# Patient Record
Sex: FEMALE | Race: ASIAN | NOT HISPANIC OR LATINO
[De-identification: names, ages, dates, MRNs, and addresses within clinical notes are randomized per-mention and may not be internally consistent; named-entity substitution may affect disease eponyms.]

---

## 2019-03-25 ENCOUNTER — TRANSCRIPTION ENCOUNTER (OUTPATIENT)
Age: 24
End: 2019-03-25

## 2019-09-10 ENCOUNTER — TRANSCRIPTION ENCOUNTER (OUTPATIENT)
Age: 24
End: 2019-09-10

## 2020-01-05 ENCOUNTER — TRANSCRIPTION ENCOUNTER (OUTPATIENT)
Age: 25
End: 2020-01-05

## 2022-04-12 PROBLEM — Z00.00 ENCOUNTER FOR PREVENTIVE HEALTH EXAMINATION: Status: ACTIVE | Noted: 2022-04-12

## 2022-05-18 ENCOUNTER — APPOINTMENT (OUTPATIENT)
Dept: BREAST CENTER | Facility: CLINIC | Age: 27
End: 2022-05-18

## 2022-08-19 ENCOUNTER — APPOINTMENT (OUTPATIENT)
Dept: OBGYN | Facility: CLINIC | Age: 27
End: 2022-08-19

## 2022-08-19 VITALS
HEART RATE: 83 BPM | RESPIRATION RATE: 17 BRPM | DIASTOLIC BLOOD PRESSURE: 76 MMHG | HEIGHT: 61 IN | BODY MASS INDEX: 19.83 KG/M2 | OXYGEN SATURATION: 98 % | SYSTOLIC BLOOD PRESSURE: 109 MMHG | WEIGHT: 105 LBS

## 2022-08-19 DIAGNOSIS — Z12.4 ENCOUNTER FOR SCREENING FOR MALIGNANT NEOPLASM OF CERVIX: ICD-10-CM

## 2022-08-19 DIAGNOSIS — Z12.39 ENCOUNTER FOR OTHER SCREENING FOR MALIGNANT NEOPLASM OF BREAST: ICD-10-CM

## 2022-08-19 PROCEDURE — 99385 PREV VISIT NEW AGE 18-39: CPT

## 2022-08-19 NOTE — END OF VISIT
[FreeTextEntry3] : I, Leah Penn, acted as a scribe on behalf of Cami Sweeney NP on 08/19/2022 \par \par All medical entries made by the scribe were at my, Cami Sweeney NP, direction and personally dictated by me on 08/19/2022 . I have reviewed the chart and agree that the record accurately reflects my personal performance of the history, physical exam, assessment and plan. I have also personally directed, reviewed, and agreed with the chart.\par

## 2022-08-19 NOTE — PLAN
[FreeTextEntry1] : HCM\par -Discussed and reviewed importance of monthly BSE; hx fibrocystic breasts\par -Pap test collected and sent at today's visit\par -Vit D/Calcium/exercise, BMD\par -Declined STI testing. Safe sexual practices discussed.\par - Rx given for Breast sono due to FBD\par - baseline mammo/sono for fibrocystic breasts \par -pt is aware that pap may be repeated due to pt being on Day 1 of her menses\par - Discussed preconception care/ PNVs\par -f/u PCP for recommended HCM, vaccinations and CA screening\par -RTO 1 year for annual or sooner if needed.\par \par Discussed issues regarding conception and pregnancy. Discussed prenatal vitamins /folic acid supplementation. Pt should avoid alcohol and NSAIDS. Recived COVID 19 precautions and vaccine during preganncy. Reviewed ovulation and timed intercourse. All questions answered. \par

## 2022-08-19 NOTE — COUNSELING
[Nutrition/ Exercise/ Weight Management] : nutrition, exercise, weight management [Vitamins/Supplements] : vitamins/supplements [Breast Self Exam] : breast self exam [Contraception/ Emergency Contraception/ Safe Sexual Practices] : contraception, emergency contraception, safe sexual practices [Preconception Care/ Fertility options] : preconception care, fertility options [Drugs/Alcohol] : drugs, alcohol [Confidentiality] : confidentiality [STD (testing, results, tx)] : STD (testing, results, tx) [HPV Vaccine] : HPV Vaccine [Lab Results] : lab results [Medication Management] : medication management

## 2022-08-19 NOTE — HISTORY OF PRESENT ILLNESS
[Y] : Patient is sexually active [No] : Patient does not have concerns regarding sex [Currently Active] : currently active [Men] : men [Yes] : Yes [Condoms] : Condoms [Patient refuses STI testing] : Patient refuses STI testing [Regular Cycle Intervals] : periods have been regular [LMPDate] : 08/1922 [TextBox_6] : 08/19/22 [FreeTextEntry1] : 08/19/22

## 2022-08-19 NOTE — PHYSICAL EXAM
[Appropriately responsive] : appropriately responsive [Alert] : alert [No Acute Distress] : no acute distress [No Murmurs] : no murmurs [Soft] : soft [Non-tender] : non-tender [Non-distended] : non-distended [No HSM] : No HSM [No Lesions] : no lesions [No Mass] : no mass [Oriented x3] : oriented x3 [No Masses] : no breast masses were palpable [Labia Majora] : normal [Labia Minora] : normal [Uterine Adnexae] : normal [Moderate] : There was moderate vaginal bleeding [FreeTextEntry6] : TEE [Breast Palpation Diffuse Fibrous Tissue Bilateral] : fibrocystic changes [Normal] : normal [No Discharge] : no discharge [FreeTextEntry4] : Pt is on her first day of menses

## 2022-08-19 NOTE — DISCUSSION/SUMMARY
[FreeTextEntry1] : 26 year y/o G0P LMP 08/19/22 presents for new pt checkup visit. She is doing well and has no complaints. \par \par GYNHx: Pt is sexually active with males. Pt uses condoms as contraceptives. Pt has regular menses. \par OBHx: Biopsy done on right breast 2 years ago, breast exam done as well approximately 2 years\par PMHx: Paternal - colon cancer \par PSHx: N/A\par Social Hx: Pt is an . Pt also denies tobacco use, alcohol use, and drug use.\par Current Meds: N/A\par Allergies: N/A\par NKDA\par \par HCM\par -Discussed and reviewed importance of monthly BSE\par -Pap test collected and sent at today's visit\par -Vit D/Calcium/exercise, BMD\par -Declined STI testing. Safe sexual practices discussed.\par - Rx given for Breast sono due to FBD\par - baseline mammo/sono for fibrocystic breasts \par -pt is aware that pap may be repeated due to pt being on Day 1 of her menses\par - Discussed preconceptions care.\par -f/u PCP for recommended HCM, vaccinations and CA screening\par -RTO 1 year for annual\par \par Discussed issues regarding conception and pregnancy. Discussed prenatal vitamins /folic acid supplementation. Pt should avoid alcohol and NSAIDS. Recived COVID 19 precautions and vaccine during preganncy. Reviewed ovulation and timed intercourse. All questions answered.

## 2022-08-25 ENCOUNTER — NON-APPOINTMENT (OUTPATIENT)
Age: 27
End: 2022-08-25

## 2022-08-25 LAB — CYTOLOGY CVX/VAG DOC THIN PREP: NORMAL

## 2022-09-02 ENCOUNTER — TRANSCRIPTION ENCOUNTER (OUTPATIENT)
Age: 27
End: 2022-09-02

## 2024-04-01 ENCOUNTER — APPOINTMENT (OUTPATIENT)
Dept: OBGYN | Facility: CLINIC | Age: 29
End: 2024-04-01
Payer: COMMERCIAL

## 2024-04-01 ENCOUNTER — ASOB RESULT (OUTPATIENT)
Age: 29
End: 2024-04-01

## 2024-04-01 VITALS
HEIGHT: 61 IN | BODY MASS INDEX: 22.47 KG/M2 | DIASTOLIC BLOOD PRESSURE: 79 MMHG | HEART RATE: 114 BPM | SYSTOLIC BLOOD PRESSURE: 125 MMHG | WEIGHT: 119 LBS

## 2024-04-01 DIAGNOSIS — Z78.9 OTHER SPECIFIED HEALTH STATUS: ICD-10-CM

## 2024-04-01 DIAGNOSIS — N92.6 IRREGULAR MENSTRUATION, UNSPECIFIED: ICD-10-CM

## 2024-04-01 DIAGNOSIS — N60.19 DIFFUSE CYSTIC MASTOPATHY OF UNSPECIFIED BREAST: ICD-10-CM

## 2024-04-01 DIAGNOSIS — Z83.49 FAMILY HISTORY OF OTHER ENDOCRINE, NUTRITIONAL AND METABOLIC DISEASES: ICD-10-CM

## 2024-04-01 DIAGNOSIS — Z80.0 FAMILY HISTORY OF MALIGNANT NEOPLASM OF DIGESTIVE ORGANS: ICD-10-CM

## 2024-04-01 PROCEDURE — 99459 PELVIC EXAMINATION: CPT

## 2024-04-01 PROCEDURE — 76817 TRANSVAGINAL US OBSTETRIC: CPT

## 2024-04-01 PROCEDURE — 99204 OFFICE O/P NEW MOD 45 MIN: CPT

## 2024-04-01 RX ORDER — PRENATAL VIT 49/IRON FUM/FOLIC 6.75-0.2MG
TABLET ORAL
Refills: 0 | Status: ACTIVE | COMMUNITY

## 2024-04-01 NOTE — PHYSICAL EXAM
[Chaperone Present] : A chaperone was present in the examining room during all aspects of the physical examination [Appropriately responsive] : appropriately responsive [Alert] : alert [No Acute Distress] : no acute distress [No Lymphadenopathy] : no lymphadenopathy [Soft] : soft [Non-tender] : non-tender [Non-distended] : non-distended [No HSM] : No HSM [No Lesions] : no lesions [No Mass] : no mass [Oriented x3] : oriented x3 [Examination Of The Breasts] : a normal appearance [No Discharge] : no discharge [No Masses] : no breast masses were palpable [Labia Majora] : normal [Labia Minora] : normal [Normal] : normal [Uterine Adnexae] : normal [FreeTextEntry1] : Chaya Sotelo [Regular Rate Rhythm] : regular rate rhythm [No Murmurs] : no murmurs [] : multiple nodules were palpated [Clear to Auscultation B/L] : clear to auscultation bilaterally

## 2024-04-01 NOTE — PLAN
[FreeTextEntry1] : - Pap done today. - Pt advised to get breast sono. - Pt given practice letter and [facility] pregnancy brochure. The group's support staff and coverage arrangement discussed. - Discussed prenatal diet. - Reviewed prenatal screening including amino, NIPS, and nuchal. - Referral given for nuchal. - Routine new OB blood work done today. - NIPS next visit. - RTO 4/25. Recommend patient get old imaging of breast to compare when goes for breast sono

## 2024-04-01 NOTE — HISTORY OF PRESENT ILLNESS
[Y] : Patient is sexually active [Monogamous (Male Partner)] : is monogamous with a male partner [No] : Patient does not have concerns regarding sex [Currently Active] : currently active [Men] : men [PapSmeardate] : 08/22 [PGxTotal] : 1 [Regular Cycle Intervals] : periods have been regular [FreeTextEntry1] : 02/04/24

## 2024-04-01 NOTE — DISCUSSION/SUMMARY
[FreeTextEntry1] : This note was written by Taamra Maloney on 04/01/2024  actively solely ARMAAN Virgen M.D.   All medical record entries made by this scribe at my, ARMAAN Hayes M.D direction and personally dictated by me on 04/01/2024 . I have personally reviewed the chart and agree that the record reflects my personal performance of the history, physical exam, assessment, and plan.
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
## 2024-04-02 LAB
ABO + RH PNL BLD: NORMAL
ALBUMIN SERPL ELPH-MCNC: 4.6 G/DL
ALP BLD-CCNC: 46 U/L
ALT SERPL-CCNC: 15 U/L
ANION GAP SERPL CALC-SCNC: 12 MMOL/L
AST SERPL-CCNC: 15 U/L
B19V IGG SER QL IA: 0.15 INDEX
B19V IGG+IGM SER-IMP: NEGATIVE
B19V IGG+IGM SER-IMP: NORMAL
B19V IGM FLD-ACNC: 0.13 INDEX
B19V IGM SER-ACNC: NEGATIVE
BASOPHILS # BLD AUTO: 0.06 K/UL
BASOPHILS NFR BLD AUTO: 0.6 %
BILIRUB SERPL-MCNC: 0.2 MG/DL
BLD GP AB SCN SERPL QL: NORMAL
BUN SERPL-MCNC: 9 MG/DL
CALCIUM SERPL-MCNC: 9.9 MG/DL
CHLORIDE SERPL-SCNC: 101 MMOL/L
CO2 SERPL-SCNC: 25 MMOL/L
CREAT SERPL-MCNC: 0.44 MG/DL
EGFR: 135 ML/MIN/1.73M2
EOSINOPHIL # BLD AUTO: 0.45 K/UL
EOSINOPHIL NFR BLD AUTO: 4.3 %
ESTIMATED AVERAGE GLUCOSE: 105 MG/DL
GLUCOSE SERPL-MCNC: 77 MG/DL
HBA1C MFR BLD HPLC: 5.3 %
HBV SURFACE AG SER QL: NONREACTIVE
HCT VFR BLD CALC: 41.3 %
HCV AB SER QL: NONREACTIVE
HCV S/CO RATIO: 0.05 S/CO
HGB A MFR BLD: 97.2 %
HGB A2 MFR BLD: 2.8 %
HGB BLD-MCNC: 13.7 G/DL
HGB FRACT BLD-IMP: NORMAL
HIV1+2 AB SPEC QL IA.RAPID: NONREACTIVE
IMM GRANULOCYTES NFR BLD AUTO: 0.2 %
LYMPHOCYTES # BLD AUTO: 1.52 K/UL
LYMPHOCYTES NFR BLD AUTO: 14.4 %
MAN DIFF?: NORMAL
MCHC RBC-ENTMCNC: 29.7 PG
MCHC RBC-ENTMCNC: 33.2 GM/DL
MCV RBC AUTO: 89.4 FL
MONOCYTES # BLD AUTO: 0.8 K/UL
MONOCYTES NFR BLD AUTO: 7.6 %
NEUTROPHILS # BLD AUTO: 7.68 K/UL
NEUTROPHILS NFR BLD AUTO: 72.9 %
PLATELET # BLD AUTO: 307 K/UL
POTASSIUM SERPL-SCNC: 3.9 MMOL/L
PROT SERPL-MCNC: 7.5 G/DL
RBC # BLD: 4.62 M/UL
RBC # FLD: 13.7 %
SODIUM SERPL-SCNC: 138 MMOL/L
T PALLIDUM AB SER QL IA: NEGATIVE
TSH SERPL-ACNC: 0.91 UIU/ML
WBC # FLD AUTO: 10.53 K/UL

## 2024-04-03 ENCOUNTER — TRANSCRIPTION ENCOUNTER (OUTPATIENT)
Age: 29
End: 2024-04-03

## 2024-04-03 LAB
LEAD BLD-MCNC: <1 UG/DL
MEV IGG FLD QL IA: <5 AU/ML
MEV IGG+IGM SER-IMP: NEGATIVE
RUBV IGG FLD-ACNC: 1.2 INDEX
RUBV IGG SER-IMP: POSITIVE
T GONDII AB SER-IMP: NEGATIVE
T GONDII AB SER-IMP: NEGATIVE
T GONDII IGG SER QL: <3 IU/ML
T GONDII IGM SER QL: <3 AU/ML
VZV AB TITR SER: NEGATIVE
VZV IGG SER IF-ACNC: 43.3 INDEX

## 2024-04-05 LAB
AR GENE MUT ANL BLD/T: NORMAL
BACTERIA UR CULT: NORMAL
CYTOLOGY CVX/VAG DOC THIN PREP: NORMAL
M TB IFN-G BLD-IMP: NEGATIVE
QUANTIFERON TB PLUS MITOGEN MINUS NIL: 3.69 IU/ML
QUANTIFERON TB PLUS NIL: 0.03 IU/ML
QUANTIFERON TB PLUS TB1 MINUS NIL: 0 IU/ML
QUANTIFERON TB PLUS TB2 MINUS NIL: 0 IU/ML

## 2024-04-09 LAB
C TRACH RRNA SPEC QL NAA+PROBE: NOT DETECTED
CFTR MUT TESTED BLD/T: NEGATIVE
FMR1 GENE MUT ANL BLD/T: NORMAL
N GONORRHOEA RRNA SPEC QL NAA+PROBE: NOT DETECTED
SOURCE AMPLIFICATION: NORMAL

## 2024-04-25 ENCOUNTER — APPOINTMENT (OUTPATIENT)
Dept: OBGYN | Facility: CLINIC | Age: 29
End: 2024-04-25

## 2024-04-25 VITALS
HEART RATE: 96 BPM | BODY MASS INDEX: 23.03 KG/M2 | WEIGHT: 122 LBS | DIASTOLIC BLOOD PRESSURE: 80 MMHG | HEIGHT: 61 IN | SYSTOLIC BLOOD PRESSURE: 119 MMHG

## 2024-04-25 PROCEDURE — 36415 COLL VENOUS BLD VENIPUNCTURE: CPT | Mod: 1L

## 2024-04-25 PROCEDURE — XXXXX: CPT | Mod: 1L

## 2024-04-25 PROCEDURE — 0502F SUBSEQUENT PRENATAL CARE: CPT | Mod: 1L

## 2024-04-29 ENCOUNTER — APPOINTMENT (OUTPATIENT)
Dept: ANTEPARTUM | Facility: CLINIC | Age: 29
End: 2024-04-29
Payer: COMMERCIAL

## 2024-04-29 ENCOUNTER — ASOB RESULT (OUTPATIENT)
Age: 29
End: 2024-04-29

## 2024-04-29 PROCEDURE — 76801 OB US < 14 WKS SINGLE FETUS: CPT

## 2024-04-29 PROCEDURE — 76813 OB US NUCHAL MEAS 1 GEST: CPT

## 2024-05-17 ENCOUNTER — TRANSCRIPTION ENCOUNTER (OUTPATIENT)
Age: 29
End: 2024-05-17

## 2024-05-23 ENCOUNTER — NON-APPOINTMENT (OUTPATIENT)
Age: 29
End: 2024-05-23

## 2024-05-23 ENCOUNTER — APPOINTMENT (OUTPATIENT)
Dept: OBGYN | Facility: CLINIC | Age: 29
End: 2024-05-23
Payer: COMMERCIAL

## 2024-05-23 VITALS
BODY MASS INDEX: 22.84 KG/M2 | SYSTOLIC BLOOD PRESSURE: 101 MMHG | HEART RATE: 94 BPM | HEIGHT: 61 IN | DIASTOLIC BLOOD PRESSURE: 69 MMHG | WEIGHT: 121 LBS

## 2024-05-23 DIAGNOSIS — Z34.92 ENCOUNTER FOR SUPERVISION OF NORMAL PREGNANCY, UNSPECIFIED, SECOND TRIMESTER: ICD-10-CM

## 2024-05-23 PROCEDURE — 0502F SUBSEQUENT PRENATAL CARE: CPT

## 2024-05-23 PROCEDURE — 36415 COLL VENOUS BLD VENIPUNCTURE: CPT

## 2024-06-02 LAB
AFP MOM: 0.68
AFP VALUE: 25.2 NG/ML
ALPHA FETOPROTEIN SERUM COMMENT: NORMAL
ALPHA FETOPROTEIN SERUM INTERPRETATION: NORMAL
ALPHA FETOPROTEIN SERUM RESULTS: NORMAL
ALPHA FETOPROTEIN SERUM TEST RESULTS: NORMAL
GESTATIONAL AGE BASED ON: NORMAL
GESTATIONAL AGE ON COLLECTION DATE: 15.7 WEEKS
INSULIN DEP DIABETES: NORMAL
MATERNAL AGE AT EDD AFP: 29 YR
MULTIPLE GESTATION: NO
OSBR RISK 1 IN: NORMAL
RACE: NORMAL
WEIGHT AFP: 121 LBS

## 2024-06-24 ENCOUNTER — APPOINTMENT (OUTPATIENT)
Dept: OBGYN | Facility: CLINIC | Age: 29
End: 2024-06-24

## 2024-06-24 VITALS
SYSTOLIC BLOOD PRESSURE: 99 MMHG | BODY MASS INDEX: 24.35 KG/M2 | DIASTOLIC BLOOD PRESSURE: 67 MMHG | WEIGHT: 129 LBS | HEART RATE: 96 BPM | HEIGHT: 61 IN

## 2024-06-24 PROCEDURE — 0502F SUBSEQUENT PRENATAL CARE: CPT

## 2024-06-28 ENCOUNTER — APPOINTMENT (OUTPATIENT)
Dept: ANTEPARTUM | Facility: CLINIC | Age: 29
End: 2024-06-28

## 2024-06-28 ENCOUNTER — ASOB RESULT (OUTPATIENT)
Age: 29
End: 2024-06-28

## 2024-06-28 PROCEDURE — 76805 OB US >/= 14 WKS SNGL FETUS: CPT

## 2024-07-26 ENCOUNTER — APPOINTMENT (OUTPATIENT)
Dept: OBGYN | Facility: CLINIC | Age: 29
End: 2024-07-26

## 2024-07-26 VITALS
WEIGHT: 136 LBS | HEART RATE: 88 BPM | DIASTOLIC BLOOD PRESSURE: 63 MMHG | BODY MASS INDEX: 25.68 KG/M2 | SYSTOLIC BLOOD PRESSURE: 95 MMHG | HEIGHT: 61 IN

## 2024-07-26 PROCEDURE — 0502F SUBSEQUENT PRENATAL CARE: CPT

## 2024-07-27 LAB
GLUCOSE 1H P 50 G GLC PO SERPL-MCNC: 130 MG/DL
HCT VFR BLD CALC: 37.1 %
HGB BLD-MCNC: 12.2 G/DL
MCHC RBC-ENTMCNC: 30.7 PG
MCHC RBC-ENTMCNC: 32.9 GM/DL
MCV RBC AUTO: 93.2 FL
PLATELET # BLD AUTO: 232 K/UL
RBC # BLD: 3.98 M/UL
RBC # FLD: 14 %
WBC # FLD AUTO: 10.98 K/UL

## 2024-08-21 ENCOUNTER — APPOINTMENT (OUTPATIENT)
Dept: OBGYN | Facility: CLINIC | Age: 29
End: 2024-08-21

## 2024-08-28 ENCOUNTER — TRANSCRIPTION ENCOUNTER (OUTPATIENT)
Age: 29
End: 2024-08-28

## 2024-09-05 ENCOUNTER — APPOINTMENT (OUTPATIENT)
Dept: OBGYN | Facility: CLINIC | Age: 29
End: 2024-09-05

## 2024-09-05 ENCOUNTER — APPOINTMENT (OUTPATIENT)
Dept: ANTEPARTUM | Facility: CLINIC | Age: 29
End: 2024-09-05